# Patient Record
Sex: MALE | Race: WHITE | Employment: FULL TIME | URBAN - METROPOLITAN AREA
[De-identification: names, ages, dates, MRNs, and addresses within clinical notes are randomized per-mention and may not be internally consistent; named-entity substitution may affect disease eponyms.]

---

## 2018-04-10 ENCOUNTER — HOSPITAL ENCOUNTER (OUTPATIENT)
Dept: OTHER | Age: 48
Discharge: HOME | End: 2018-04-10
Attending: NURSE PRACTITIONER | Admitting: NURSE PRACTITIONER

## 2018-04-10 DIAGNOSIS — M25.522 LEFT ELBOW PAIN: ICD-10-CM

## 2018-04-10 DIAGNOSIS — M25.812 OTHER SPECIFIED JOINT DISORDERS, LEFT SHOULDER: ICD-10-CM

## 2022-08-06 ENCOUNTER — HOSPITAL ENCOUNTER (EMERGENCY)
Age: 52
Discharge: HOME | End: 2022-08-06
Payer: COMMERCIAL

## 2022-08-06 ENCOUNTER — APPOINTMENT (OUTPATIENT)
Dept: CT IMAGING | Age: 52
End: 2022-08-06
Payer: COMMERCIAL

## 2022-08-06 VITALS
BODY MASS INDEX: 35.42 KG/M2 | TEMPERATURE: 98.2 F | HEART RATE: 72 BPM | OXYGEN SATURATION: 98 % | DIASTOLIC BLOOD PRESSURE: 111 MMHG | WEIGHT: 253 LBS | HEIGHT: 71 IN | RESPIRATION RATE: 24 BRPM | SYSTOLIC BLOOD PRESSURE: 175 MMHG

## 2022-08-06 DIAGNOSIS — R10.9 RIGHT FLANK PAIN: ICD-10-CM

## 2022-08-06 DIAGNOSIS — N20.1 URETERIC STONE: Primary | ICD-10-CM

## 2022-08-06 LAB
A/G RATIO: 1.4 (ref 1.1–2.2)
ALBUMIN SERPL-MCNC: 4.6 G/DL (ref 3.4–5)
ALP BLD-CCNC: 110 U/L (ref 40–129)
ALT SERPL-CCNC: 17 U/L (ref 10–40)
ANION GAP SERPL CALCULATED.3IONS-SCNC: 11 MMOL/L (ref 3–16)
AST SERPL-CCNC: 34 U/L (ref 15–37)
BASOPHILS ABSOLUTE: 0.1 K/UL (ref 0–0.2)
BASOPHILS RELATIVE PERCENT: 1 %
BILIRUB SERPL-MCNC: 0.5 MG/DL (ref 0–1)
BUN BLDV-MCNC: 16 MG/DL (ref 7–20)
CALCIUM SERPL-MCNC: 9.8 MG/DL (ref 8.3–10.6)
CHLORIDE BLD-SCNC: 102 MMOL/L (ref 99–110)
CO2: 24 MMOL/L (ref 21–32)
CREAT SERPL-MCNC: 1.1 MG/DL (ref 0.9–1.3)
EOSINOPHILS ABSOLUTE: 0 K/UL (ref 0–0.6)
EOSINOPHILS RELATIVE PERCENT: 0.1 %
GFR AFRICAN AMERICAN: >60
GFR NON-AFRICAN AMERICAN: >60
GLUCOSE BLD-MCNC: 121 MG/DL (ref 70–99)
HCT VFR BLD CALC: 48.6 % (ref 40.5–52.5)
HEMOGLOBIN: 16.5 G/DL (ref 13.5–17.5)
LIPASE: 37 U/L (ref 13–60)
LYMPHOCYTES ABSOLUTE: 1 K/UL (ref 1–5.1)
LYMPHOCYTES RELATIVE PERCENT: 10.4 %
MCH RBC QN AUTO: 31.4 PG (ref 26–34)
MCHC RBC AUTO-ENTMCNC: 33.9 G/DL (ref 31–36)
MCV RBC AUTO: 92.9 FL (ref 80–100)
MONOCYTES ABSOLUTE: 0.2 K/UL (ref 0–1.3)
MONOCYTES RELATIVE PERCENT: 2.2 %
NEUTROPHILS ABSOLUTE: 8 K/UL (ref 1.7–7.7)
NEUTROPHILS RELATIVE PERCENT: 86.3 %
PDW BLD-RTO: 13.2 % (ref 12.4–15.4)
PLATELET # BLD: 282 K/UL (ref 135–450)
PMV BLD AUTO: 6.7 FL (ref 5–10.5)
POTASSIUM SERPL-SCNC: 4.7 MMOL/L (ref 3.5–5.1)
RBC # BLD: 5.24 M/UL (ref 4.2–5.9)
REASON FOR REJECTION: NORMAL
REJECTED TEST: NORMAL
SODIUM BLD-SCNC: 137 MMOL/L (ref 136–145)
TOTAL PROTEIN: 7.8 G/DL (ref 6.4–8.2)
WBC # BLD: 9.3 K/UL (ref 4–11)

## 2022-08-06 PROCEDURE — 80053 COMPREHEN METABOLIC PANEL: CPT

## 2022-08-06 PROCEDURE — 96375 TX/PRO/DX INJ NEW DRUG ADDON: CPT

## 2022-08-06 PROCEDURE — 6360000002 HC RX W HCPCS: Performed by: PHYSICIAN ASSISTANT

## 2022-08-06 PROCEDURE — 36415 COLL VENOUS BLD VENIPUNCTURE: CPT

## 2022-08-06 PROCEDURE — 96374 THER/PROPH/DIAG INJ IV PUSH: CPT

## 2022-08-06 PROCEDURE — 99284 EMERGENCY DEPT VISIT MOD MDM: CPT

## 2022-08-06 PROCEDURE — 74176 CT ABD & PELVIS W/O CONTRAST: CPT

## 2022-08-06 PROCEDURE — 85025 COMPLETE CBC W/AUTO DIFF WBC: CPT

## 2022-08-06 PROCEDURE — 83690 ASSAY OF LIPASE: CPT

## 2022-08-06 PROCEDURE — 96376 TX/PRO/DX INJ SAME DRUG ADON: CPT

## 2022-08-06 RX ORDER — NAPROXEN 500 MG/1
500 TABLET ORAL 2 TIMES DAILY PRN
Qty: 180 TABLET | Refills: 1 | Status: SHIPPED | OUTPATIENT
Start: 2022-08-06

## 2022-08-06 RX ORDER — ONDANSETRON 2 MG/ML
4 INJECTION INTRAMUSCULAR; INTRAVENOUS ONCE
Status: COMPLETED | OUTPATIENT
Start: 2022-08-06 | End: 2022-08-06

## 2022-08-06 RX ORDER — OXYCODONE AND ACETAMINOPHEN 7.5; 325 MG/1; MG/1
1 TABLET ORAL 2 TIMES DAILY
Qty: 60 TABLET | Refills: 0 | Status: SHIPPED | OUTPATIENT
Start: 2022-08-06 | End: 2022-08-11

## 2022-08-06 RX ORDER — ONDANSETRON 4 MG/1
4 TABLET, ORALLY DISINTEGRATING ORAL EVERY 8 HOURS PRN
Qty: 20 TABLET | Refills: 0 | Status: SHIPPED | OUTPATIENT
Start: 2022-08-06

## 2022-08-06 RX ORDER — KETOROLAC TROMETHAMINE 30 MG/ML
15 INJECTION, SOLUTION INTRAMUSCULAR; INTRAVENOUS EVERY 6 HOURS PRN
Status: DISCONTINUED | OUTPATIENT
Start: 2022-08-06 | End: 2022-08-06 | Stop reason: HOSPADM

## 2022-08-06 RX ADMIN — ONDANSETRON HYDROCHLORIDE 4 MG: 2 INJECTION, SOLUTION INTRAMUSCULAR; INTRAVENOUS at 18:08

## 2022-08-06 RX ADMIN — ONDANSETRON HYDROCHLORIDE 4 MG: 2 INJECTION, SOLUTION INTRAMUSCULAR; INTRAVENOUS at 17:19

## 2022-08-06 RX ADMIN — HYDROMORPHONE HYDROCHLORIDE 1 MG: 1 INJECTION, SOLUTION INTRAMUSCULAR; INTRAVENOUS; SUBCUTANEOUS at 17:19

## 2022-08-06 RX ADMIN — HYDROMORPHONE HYDROCHLORIDE 1 MG: 1 INJECTION, SOLUTION INTRAMUSCULAR; INTRAVENOUS; SUBCUTANEOUS at 18:08

## 2022-08-06 ASSESSMENT — PAIN SCALES - GENERAL
PAINLEVEL_OUTOF10: 10
PAINLEVEL_OUTOF10: 10

## 2022-08-06 ASSESSMENT — PAIN DESCRIPTION - LOCATION: LOCATION: ABDOMEN;BACK;FLANK

## 2022-08-06 ASSESSMENT — PAIN DESCRIPTION - PAIN TYPE: TYPE: ACUTE PAIN

## 2022-08-06 ASSESSMENT — PAIN DESCRIPTION - DESCRIPTORS: DESCRIPTORS: SHARP

## 2022-08-06 ASSESSMENT — PAIN - FUNCTIONAL ASSESSMENT: PAIN_FUNCTIONAL_ASSESSMENT: 0-10

## 2022-08-06 NOTE — ED PROVIDER NOTES
Emergency 1 [facility] [place] [address] ED    Patient: Steven Dunn  LEANDER:[id number]  : 1970  Date of Evaluation: 2022  ED AAYUSH Provider: HERNESTO Arroyo    Chief Complaint       Chief Complaint   Patient presents with    Abdominal Pain     Right sided pain that goes to the back. Reports vomiting. sweating in triage      [place]     I was wearing a surgical mask for the entirety of this encounter. Does this patient come from an ECF, SNF, Rehab, Group Home or other Congregate setting: No  (If yes to above patient needs a Covid-19 test)    Steven Dunn is a 47 y.o. male who presents to the emergency department in acute distress secondary to 1 hour history of exquisite 10 out of 10 right-sided flank pain with some radiation to his right lower quadrant. Patient denies history of kidney stone denies other history of abdominal pain but complains now of sharp stabbing pain to his right flank with radiation into his right lower quadrant as well as associated dysuria marked by hesitancy of urination without other radiation aggravating or limiting factors x1 day    ROS:     Review of Systems  At least 10 systemsreviewed and otherwise acutely negative except as in the [address] Past History     Past Medical History:   Diagnosis Date    Chronic kidney disease ([address]     Enlarged prostate     Hyperlipidemia     Hypertension      Past Surgical History:   Procedure Laterality Date    COLONOSCOPY  6/10/11    CYST REMOVAL  2012    Excision cyst upper back    ROTATOR CUFF REPAIR       Social History     Socioeconomic History    Marital status:    Tobacco Use    Smoking status: Never    Smokeless tobacco: Never   Substance and Sexual Activity    Alcohol use: Yes     Comment: 2 x a year    Drug use: No    Sexual activity: Yes     Partners: Female       Medications/Allergies     Previous Medications    FISH OIL-OMEGA-3 FATTY ACIDS 1000 MG CAPSULE    Take 2 g by mouth daily. LISINOPRIL PO    Take 5 mg by mouth daily.      Allergies   Allergen Reactions    Tramadol Nausea And Vomiting        Physical Exam       ED Triage Vitals   BP Temp Temp src Pulse Resp SpO2 Height Weight   -- -- -- -- -- -- -- --   BP (!) 175/111   Pulse 72   Temp 98.2 °F (36.8 °C) (Oral)   Resp 24   Ht 5' 11\" (1.803 m)   Wt 253 lb (114.8 kg)   SpO2 98%   BMI 35.29 kg/m²     Physical Exam  Noted hypertension likely secondary to pain non-tachycardic afebrile noted tachypnea satting well on room air  Constitutional:  Well-nourished well-developed in no moderate distress secondary to exquisite right-sided flank pain with radiation to his right lower quadrant of his abdomen as described above   Eyes:  PERRLA, EOMI x6, no nystagmus no photophobia  HENT:  Teeth and tongue intact, uvula midline, no PTA or retropharyngeal abscess noted no other intraoral lesion or edema appreciated patient tolerating secretions well, no stridor, no nuchal rigidity  Respiratory:  Clear to auscultation bilaterally, no crepitus or subcutaneous emphysema noted with palpation in the bilateral chest wall  Cardiovascular:  S1-S2, no S3  GI:  Abdomen soft nontender nondistended right-sided flank pain with radiation to his right lower quadrant difficult to evaluate secondary to patient's guarding  :  Exam deferred for now no subjective complaints  Musculoskeletal:  Distally neurovascularly intact 2+ pulses normal capillary refill gross sensorimotor intact ×4 extremities  Integument:  Skin is warm well perfused  Lymphatic:  No significant lymphadenopathy appreciated  Neurologic:  Alert and oriented ×3, cranial nerves II through XII grossly intact as tested, patient able to ambulate, no ataxia, cauda equina, saddle anesthesia or bowel or bladder incontinence  Psychiatric:  Mood, behavior, judgment all within normal limits    SCREENINGS    [place] Coma Scale  Eye Opening: Spontaneous  Best Verbal Response: Oriented  Best Motor Response: Obeys commands  [place] Coma Scale Score: 15      Diagnostics   Labs:  Results for orders placed or performed during the hospital encounter of 08/06/22   CBC with Auto Differential   Result Value Ref Range    WBC 9.3 4.0 - 11.0 K/uL    RBC 5.24 4.20 - 5.90 M/uL    Hemoglobin 16.5 13.5 - 17.5 g/dL    Hematocrit 48.6 40.5 - 52.5 %    MCV 92.9 80.0 - 100.0 fL    MCH 31.4 26.0 - 34.0 pg    MCHC 33.9 31.0 - 36.0 g/dL    RDW 13.2 12.4 - 15.4 %    Platelets 018 895 - 596 K/uL    MPV 6.7 5.0 - 10.5 fL    Neutrophils % 86.3 %    Lymphocytes % 10.4 %    Monocytes % 2.2 %    Eosinophils % 0.1 %    Basophils % 1.0 %    Neutrophils Absolute 8.0 (H) 1.7 - 7.7 K/uL    Lymphocytes Absolute 1.0 1.0 - 5.1 K/uL    Monocytes Absolute 0.2 0.0 - 1.3 K/uL    Eosinophils Absolute 0.0 0.0 - 0.6 K/uL    Basophils Absolute 0.1 0.0 - 0.2 K/uL   CMP   Result Value Ref Range    Sodium 137 136 - 145 mmol/L    Potassium 4.7 3.5 - 5.1 mmol/L    Chloride 102 99 - 110 mmol/L    CO2 24 21 - 32 mmol/L    Anion Gap 11 3 - 16    Glucose 121 (H) 70 - 99 mg/dL    BUN 16 7 - 20 mg/dL    Creatinine 1.1 0.9 - 1.3 mg/dL    GFR Non-African American >60 >60    GFR African American >60 >60    Calcium 9.8 8.3 - 10.6 mg/dL    Total Protein 7.8 6.4 - 8.2 g/dL    Albumin 4.6 3.4 - 5.0 g/dL    Albumin/Globulin Ratio 1.4 1.1 - 2.2    Total Bilirubin 0.5 0.0 - 1.0 mg/dL    Alkaline Phosphatase 110 40 - 129 U/L    ALT 17 10 - 40 U/L    AST 34 15 - 37 U/L   Lipase   Result Value Ref Range    Lipase 37.0 13.0 - 60.0 U/L   SPECIMEN REJECTION   Result Value Ref Range    Rejected Test uamic     Reason for Rejection see below      Radiographs:  CT ABDOMEN PELVIS WO CONTRAST Additional Contrast? None    Result Date: 8/6/2022  EXAMINATION: CT OF THE ABDOMEN AND PELVIS WITHOUT CONTRAST 8/6/2022 6:05 pm TECHNIQUE: CT of the abdomen and pelvis was performed without the administration of intravenous contrast. Multiplanar reformatted images are provided for review.  Automated exposure control, iterative reconstruction, and/or weight based adjustment of the mA/kV was utilized to reduce the radiation dose to as low as reasonably achievable. COMPARISON: None. HISTORY: ORDERING SYSTEM PROVIDED HISTORY: right sided flank pain TECHNOLOGIST PROVIDED HISTORY: Reason for exam:->right sided flank pain Additional Contrast?->None Decision Support Exception - unselect if not a suspected or confirmed emergency medical condition->Emergency Medical Condition (MA) Reason for Exam: rlq pain FINDINGS: Lower Chest: Mild dependent atelectasis in the lower lung fields. Organs: The liver, spleen, pancreas and adrenal glands are unremarkable. No acute biliary findings. 4 mm proximal right ureteral calculus with mild hydronephrosis. No other renal or ureteral calculi. 3.3 cm left renal cyst with no imaging follow-up indicated. GI/Bowel: The stomach and duodenal sweep are unremarkable. No small bowel distension. No evidence of appendicitis. No pericolonic inflammatory changes. Scattered diverticulosis with no acute features. Pelvis: No pelvic mass or free pelvic fluid. The prostate is not enlarged. Mild distention of the urinary bladder. Peritoneum/Retroperitoneum: Abdominal aorta is normal in caliber. No retroperitoneal adenopathy or upper abdominal ascites. Bones/Soft Tissues: No acute osseous or soft tissue abnormality. 1. 4 mm proximal right ureteral calculus with mild hydronephrosis. 2. No other acute findings within the abdomen or pelvis. Scattered diverticulosis with no acute features.           ED Course and MDM    [place] Coma Scale  Eye Opening: Spontaneous  Best Verbal Response: Oriented  Best Motor Response: Obeys commands  [place] Coma Scale Score: 15      In brief, Luisa Manning is a 47 y.o. male who presented to the emergency department for intractable right-sided flank pain which was difficult to control patient was ultimately evaluated with noncontrast CT of the abdomen pelvis which demonstrated evidence of a 4 mm proximal ureteral stone    After reasonable pain control was achieved I spoke with Dr. Lupillo Owens of Urology who agreed to facilitate the patient's outpatient evaluation and treatment    Patient was instructed on treatment care plan and verbalized understanding and agreement    ED Medication Orders (From admission, onward)      Start Ordered     Status Ordering Provider    08/06/22 1815 08/06/22 1805  HYDROmorphone (DILAUDID) injection 1 mg  ONCE         Last MAR action: Given - by Martha Dumont on 08/06/22 at [address]    08/06/22 1815 08/06/22 1805  ondansetron (ZOFRAN) injection 4 mg  ONCE         Last MAR action: Given - by Martha Dumont on 08/06/22 at [address]    08/06/22 1715 08/06/22 1712  HYDROmorphone (DILAUDID) injection 1 mg  ONCE         Last MAR action: Given - by Arely Navarro on 08/06/22 at [address] [employer] [facility]    08/06/22 1715 08/06/22 1712  ondansetron (ZOFRAN) injection 4 mg  ONCE         Last MAR action: Given - by Arely Navarro on 08/06/22 at [address] [email or web address] [place]    08/06/22 1711 08/06/22 1712  ketorolac (TORADOL) injection 15 mg  EVERY 6 HOURS PRN         Acknowledged NATALIE GONZALES            Final Impression      1. Ureteric stone    2. Right flank pain        DISPOSITION Decision To Discharge 08/06/2022 07:33:30 PM     Patient seen independently with an Emergency Medicine attending available for supervision.     (Please note that portions of this note may have been completed with a voice recognition program. Efforts were made to edit the dictations but occasionally words aremis-transcribed.)    Estephania Mix, [address] [place], [place]  08/06/22 2300